# Patient Record
Sex: MALE | Race: WHITE | NOT HISPANIC OR LATINO | Employment: FULL TIME | URBAN - METROPOLITAN AREA
[De-identification: names, ages, dates, MRNs, and addresses within clinical notes are randomized per-mention and may not be internally consistent; named-entity substitution may affect disease eponyms.]

---

## 2020-06-06 ENCOUNTER — OCCUPATIONAL MEDICINE (OUTPATIENT)
Dept: URGENT CARE | Facility: PHYSICIAN GROUP | Age: 45
End: 2020-06-06
Payer: COMMERCIAL

## 2020-06-06 ENCOUNTER — HOSPITAL ENCOUNTER (OUTPATIENT)
Dept: RADIOLOGY | Facility: MEDICAL CENTER | Age: 45
End: 2020-06-06
Attending: PHYSICIAN ASSISTANT
Payer: COMMERCIAL

## 2020-06-06 VITALS
DIASTOLIC BLOOD PRESSURE: 76 MMHG | OXYGEN SATURATION: 98 % | BODY MASS INDEX: 31.69 KG/M2 | RESPIRATION RATE: 18 BRPM | HEART RATE: 80 BPM | SYSTOLIC BLOOD PRESSURE: 128 MMHG | TEMPERATURE: 97.8 F | WEIGHT: 234 LBS | HEIGHT: 72 IN

## 2020-06-06 DIAGNOSIS — S63.650A SPRAIN OF METACARPOPHALANGEAL (MCP) JOINT OF RIGHT INDEX FINGER, INITIAL ENCOUNTER: ICD-10-CM

## 2020-06-06 PROCEDURE — 99203 OFFICE O/P NEW LOW 30 MIN: CPT | Performed by: PHYSICIAN ASSISTANT

## 2020-06-06 PROCEDURE — 73130 X-RAY EXAM OF HAND: CPT | Mod: RT

## 2020-06-06 ASSESSMENT — ENCOUNTER SYMPTOMS
DIAPHORESIS: 0
COUGH: 0
FEVER: 0
SORE THROAT: 0
CHILLS: 0
DIZZINESS: 0

## 2020-06-06 NOTE — PROGRESS NOTES
Subjective:   Harinder Simpson is a 45 y.o. male who presents for Finger Injury (New WC - R index finger, swollen xyesterday, injured pulling a case out, pt states he heard a pop)    HPI:  DOI: 6/5/2020  MRI: Patient was pulling a box of celery out of his truck when he heard a pop to his right index finger and noticed some pain and swelling.  Currently he is in mild pain and only notices pain when he flexes his finger or tries to lift.  He describes the pain as a deep throbbing sensation with occasional radiating pain of his right forearm.  He states he still feels some clicking and popping sensation when he flexes his index finger.  He has not tried anything for the pain as of yet.  The patient is right-handed.  He denies having a second job.  He denies any previous injury or surgery to this hand or finger.      Review of Systems   Constitutional: Negative for chills, diaphoresis and fever.   HENT: Negative for sore throat.    Respiratory: Negative for cough.    Cardiovascular: Negative for chest pain.   Musculoskeletal:        Pain to the right index finger.  Describes a popping and clicking sensation with finger flexion.  Mild tenderness to the proximal phalanx.  Denies any loss of sensation.  Pain is described as a dull ache with occasional radiating pain of his right forearm.   Skin: Negative for rash.        No bruising to the right index finger or hand.   Neurological: Negative for dizziness.       PMH:   No pertinent past medical history to this problem  MEDS:  Medications were reviewed in EMR  ALLERGIES:  Allergies were reviewed in EMR  FH:   No pertinent family history to this problem       Objective:     /76 (BP Location: Left arm, Patient Position: Sitting, BP Cuff Size: Adult)   Pulse 80   Temp 36.6 °C (97.8 °F) (Temporal)   Resp 18   Ht 1.829 m (6')   Wt 106.1 kg (234 lb)   SpO2 98%   BMI 31.74 kg/m²     Physical Exam    Constitutional: Pt is oriented to person, place, and time.   Appears well-developed and well-nourished. No distress.   HENT:   Mouth/Throat: Oropharynx is clear and moist.   Eyes: Conjunctivae are normal.   Cardiovascular: Normal rate.    Pulmonary/Chest: Effort normal.     Musculoskeletal:   Right hand and wrist: No pain to palpation over the CMC joints metacarpals or MCP joints.  Full and pain-free range of motion to the wrist.  No discoloration or edema noted on the hand or wrist.    Right index finger: No pain to palpation over the MCP joint.  Mild tenderness to palpation over the proximal phalanx.  No tenderness palpation over the PIP or DIP joints.  Mild uniform edema without any ecchymosis or erythema.  No lacerations or sign of infection.  The patient is distally neurovascularly intact.  Patient has full flexion of the MCP PIP and DIP joints.  He is able to fully extend the finger.  Capillary refill is less than 2 seconds.  Strength is 5/5 but does have some discomfort.    Neurological: Pt is alert and oriented to person, place, and time. Coordination normal.   Skin: Skin is warm. Pt is not diaphoretic. No erythema.   Psychiatric: Pt has a normal mood and affect.  Behavior is normal.     X-ray right hand: No acute fracture or bony abnormality.      Assessment/Plan:     Diagnosis and associated orders:     1. Sprain of metacarpophalangeal (MCP) joint of right index finger, initial encounter  DX-HAND 3+ RIGHT      Comments/MDM:       • Differential diagnoses and treatment options were discussed with the patient at length today.  No bony abnormalities or dislocation found on x-ray.  Continue using ice to decrease inflammation.  May use ibuprofen and Tylenol as needed for pain.  Volar splint was given in clinic and finger buddy taped for comfort.  Only wear for comfort.  Follow-up in clinic on 6/12/2020 for reevaluation.           Differential diagnosis, natural history, supportive care, and indications for immediate follow-up discussed.    Advised the patient to  follow-up with the primary care physician for recheck, reevaluation, and consideration of further management.    Please note that this dictation was created using voice recognition software. I have made reasonable attempt to correct obvious errors, but I expect that there are errors of grammar and possibly content that I did not discover before finalizing the note.    This note was electronically signed by JAYSON Ventura PA-C

## 2020-06-06 NOTE — PATIENT INSTRUCTIONS
Finger Sprain  A finger sprain is an injury to one of the strong bands of tissue (ligaments) that connect the bones in the finger. The ligament can be stretched too much, or it can tear. A tear can be either partial or complete. The severity of the sprain depends on how much of the ligament was damaged or torn.  CAUSES  This injury is often caused by a fall or an accident. For example, if you extend your hands to catch an object or to protect yourself during a fall, the force of impact may cause the ligaments in your finger to stretch too much.  RISK FACTORS  The following factors may make you more likely to have this injury:  · Playing sports that involve a greater risk of falling, such as skiing.  · Playing sports that involve catching an object, such as basketball.  · Having poor strength and flexibility.  SYMPTOMS  Symptoms of this condition include:  · Pain at the affected finger joint, especially when bending or extending the finger.  · Loss of motion in the finger.  · Swelling.  · Tenderness.  · Bruising.  DIAGNOSIS  This condition is diagnosed with a medical history and physical exam. You may also have an X-ray of your finger to rule out a fracture or dislocation.  TREATMENT  Treatment varies depending on the severity of the sprain. If your ligament is overstretched or partially torn, treatment usually involves:  · Keeping the finger in a fixed position (immobilization) for a period of time. To help you do this, your health care provider may apply a bandage, splint, or cast to keep the finger from moving until it heals. In some cases, the finger may be taped to the fingers beside it (teetee taping).  · Taking medicines for pain.  · Doing exercises for the finger after it has begun to heal.  If your ligament is fully torn, you may need surgery to reconnect the ligament to the bone. After surgery, a cast or splint will be applied.  HOME CARE INSTRUCTIONS  If You Have a Splint:   · Wear the splint as told by  your health care provider. Remove it only as told by your health care provider.  · Loosen the splint if your fingers tingle, become numb, or turn cold and blue.  · Do not let your splint get wet if it is not waterproof.  · Keep the splint clean.  If You Have a Cast:   · Do not stick anything inside the cast to scratch your skin. Doing that increases your risk of infection.  · Check the skin around the cast every day. Tell your health care provider about any concerns.  · You may put lotion on dry skin around the edges of the cast. Do not put lotion on the skin underneath the cast.  · Do not let your cast get wet if it is not waterproof.  · Keep the cast clean.  Bathing   · If your splint or cast is not waterproof, cover it with a watertight plastic bag when you take a bath or a shower.  · Keep any bandages (dressings) dry until your health care provider says they can be removed.  Managing Pain, Stiffness, and Swelling   · If directed, put ice on the injured area:  ¨ Put ice in a plastic bag.  ¨ Place a towel between your skin and the bag.  ¨ Leave the ice on for 20 minutes, 2-3 times a day.  · Move your fingers often to avoid stiffness and to lessen swelling.  · Raise (elevate) the injured area above the level of your heart while you are sitting or lying down.  General Instructions   · Do not put pressure on any part of the cast or splint until it is fully hardened. This may take several hours.  · Take over-the-counter and prescription medicines only as told by your health care provider.  · Do not drive or operate heavy machinery while taking prescription pain medicine.  · Do exercises as told by your health care provider or physical therapist.  · Do not wear rings on your injured finger.  · Keep all follow-up visits as told by your health care provider. This is important.  SEEK MEDICAL CARE IF:  · Your pain is not controlled with medicine.  · Your bruising or swelling gets worse.  · Your cast or splint is  damaged.  · Your finger is numb or blue.  · Your finger feels colder than normal.  This information is not intended to replace advice given to you by your health care provider. Make sure you discuss any questions you have with your health care provider.  Document Released: 01/25/2006 Document Revised: 04/10/2017 Document Reviewed: 10/27/2016  [employer] Interactive Patient Education © 2017 [employer]

## 2020-06-06 NOTE — LETTER
[facility] [address] [place] [address]  Phone:  [phone] - Fax:  [phone]   [facility] Progress Report and Disability Certification  Date of Service: 6/6/2020   No Show:  No  Date / Time of Next Visit: 6/12/2020   Claim Information   Patient Name: Harinder Simpson  Claim Number:     Employer: [employer]  Date of Injury: 6/5/2020     Insurer / TPA: [employer] ID / SSN:     Occupation: Supervisor  Diagnosis: The encounter diagnosis was Sprain of metacarpophalangeal (MCP) joint of right index finger, initial encounter.    Medical Information   Related to Industrial Injury? Yes    Subjective Complaints:  HPI:   DOI: 6/5/2020  JOSE L: Patient was pulling a box of celery out of his truck when he heard a pop to his right index finger and noticed some pain and swelling.  Currently he is in mild pain and only notices pain when he flexes his finger or tries to the lift.  He describes the pain as a deep throbbing sensation with occasional radiating pain up his right forearm.  He states he still feels some clicking and popping sensation when he flexes his index finger.  He has not tried anything for the pain as of yet.  The patient is right-handed.  He denies having a second job.  He denies any previous injury or surgery to this hand or finger.    Review of Systems   Constitutional: Negative for chills, diaphoresis and fever.   HENT: Negative for sore throat.    Respiratory: Negative for cough.    Cardiovascular: Negative for chest pain.   Musculoskeletal:        Pain to the right index finger.  Describes a popping and clicking sensation with finger flexion.  Mild tenderness to the proximal phalanx.  Denies any loss of sensation.  Pain is described as a dull ache with occasional radiating pain of his right forearm.   Skin: Negative for rash.        No bruising to the right index finger or hand.   Neurological: Negative for dizziness.     PMH:      No pertinent past medical history to this problem  MEDS:  Medications were reviewed in EMR  ALLERGIES:  Allergies were reviewed in EMR  FH:   No pertinent family history to this problem     Objective Findings: Constitutional: Pt is oriented to person, place, and time.  Appears well-developed and well-nourished. No distress.   HENT:   Mouth/Throat: Oropharynx is clear and moist.   Eyes: Conjunctivae are normal.   Cardiovascular: Normal rate.    Pulmonary/Chest: Effort normal.   Musculoskeletal:   Right hand and wrist: No pain to palpation over the CMC joints metacarpals or MCP joints.  Full and pain-free range of motion to the wrist.  No discoloration or edema noted on the hand or wrist.  Right index finger: No pain to palpation over the MCP joint.  Mild tenderness to palpation over the proximal phalanx.  No tenderness palpation over the PIP or DIP joints.  Mild uniform edema without any ecchymosis or erythema.  No lacerations or sign of infection.  The patient is distally neurovascularly intact.  Patient has full flexion of the MCP PIP and DIP joints.  He is able to fully extend the finger.  Capillary refill is less than 2 seconds.  Strength is 5/5 but does have some discomfort.  Neurological: Pt is alert and oriented to person, place, and time. Coordination normal.   Skin: Skin is warm. Pt is not diaphoretic. No erythema.   Psychiatric: Pt has a normal mood and affect.  Behavior is normal.      Pre-Existing Condition(s):     Assessment:   Initial Visit    Status: Additional Care Required  Permanent Disability:No    Plan:      Diagnostics: X-ray    Comments:  No acute fracture or bony abnormality found on x-ray today.  Recommended rice therapy.  May use Tylenol and ibuprofen as needed for pain.    Disability Information   Status: Released to Restricted Duty    From:  6/6/2020  Through: 6/12/2020 Restrictions are: Temporary   Physical Restrictions   Sitting:    Standing:    Stooping:    Bending:      Squatting:     Walking:    Climbing:    Pushing:      Pulling:    Other:    Reaching Above Shoulder (L):   Reaching Above Shoulder (R):       Reaching Below Shoulder (L):    Reaching Below Shoulder (R):      Not to exceed Weight Limits   Carrying(hrs):   Weight Limit(lb):   Lifting(hrs):   Weight  Limit(lb):     Comments: Limit amount of lifting with right hand.  Do not lift more than 15 pounds with right hand.    Repetitive Actions   Hands: i.e. Fine Manipulations from Grasping:     Feet: i.e. Operating Foot Controls:     Driving / Operate Machinery:     Physician Name: Andreia Ventura P.A.-C. Physician Signature: ANDREIA Venegas P.A.-C. e-Signature: Dr. Aureliano Decker, Medical Director   Clinic Name / Location: [address] [address] Clinic Phone Number: Dept: [phone]   Appointment Time: 11:50 Am Visit Start Time: 12:11 PM   Check-In Time:  12:06 Pm Visit Discharge Time:  2:17PM   Original-Treating Physician or Chiropractor    Page 2-Insurer/TPA    Page 3-Employer    Page 4-Employee

## 2020-06-06 NOTE — LETTER
EMPLOYEE’S CLAIM FOR COMPENSATION/ REPORT OF INITIAL TREATMENT  FORM C-4    EMPLOYEE’S CLAIM - PROVIDE ALL INFORMATION REQUESTED   First Name  Harinder Last Name  Tatiana Birthdate                    1975                Sex  Male Claim Number   Home Address  [address] Age  45 y.o. 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 (if applicable)  pulling a case of celery    How did this injury or occupational disease occur? (Be specific an answer in detail. Use additional sheet if necessary)  Was moving a case of celery went to side it hooked it with my right index finger. finger popped   If you believe that you have an occupational disease, when did you first have knowledge of the disability and it relationship to your employment?  n/a Witnesses to the Accident  n/a      Nature of Injury or Occupational Disease  Workers' Compensation  Part(s) of Body Injured or Affected  Finger (R), N/A, N/A    I certify that the above is true and correct to the best of my knowledge and that I have provided this information in order to obtain the benefits of [facility]  Industrial Insurance and Occupational Diseases Acts [place] inclusive or Chapter 617 of [place]).  I hereby authorize any physician, chiropractor, surgeon, practitioner, or other person, any hospital, including [facility] or [facility], any medical service organization, any insurance company, or other institution or organization to release to each other, any medical or other information, including benefits paid or payable, pertinent to this injury or disease, except information relative to diagnosis, treatment and/or counseling for AIDS, psychological conditions, alcohol or controlled substances, for which I must give specific authorization.  A Photostat of this authorization shall be as valid as the original.     Date   Place   Employee’s Signature   THIS REPORT MUST BE COMPLETED AND MAILED WITHIN 3 WORKING DAYS OF TREATMENT   Place  [facility]  Name of Facility  [place]   Date  6/6/2020 Diagnosis  (S63.650A) Sprain of metacarpophalangeal (MCP) joint of right index finger, initial encounter Is there evidence the injured employee was under the influence of alcohol and/or another controlled substance at the time of accident?   Hour  12:11 PM Description of Injury or Disease  The encounter diagnosis was Sprain of metacarpophalangeal (MCP) joint of right index finger, initial encounter. No   Treatment  No acute fracture or bony abnormality found on x-ray today.  RICE TREATMENT FOR EXTREMITY INJURIES:  R-rest the extremity as much as possible while pain and swelling persist  I-ice the extremity 15 minutes every 2 hours for the first 24 hours, then 4-5 times daily   C-compress the extremity either with splint or ace wrap as directed  E-elevate the extremity to help with swelling    May use Tylenol and ibuprofen as needed for pain and inflammation.  Volar finger splint provided in clinic today use as needed for comfort.  Follow-up in clinic on 6/12/2020 for recheck and  "evaluation.  Have you advised the patient to remain off work five days or more? No   X-Ray Findings  Negative   If Yes   From Date  To Date      From information given by the employee, together with medical evidence, can you directly connect this injury or occupational disease as job incurred?  Yes If No Full Duty No Modified Duty  Yes   Is additional medical care by a physician indicated?  Yes If Modified Duty, Specify any Limitations / Restrictions  Do not lift more than 15 pounds with the right hand.   Do you know of any previous injury or disease contributing to this condition or occupational disease?                            No   Date  6/6/2020 Print Doctor’s Name Andreia Venutra P.A.-C. I certify the employer’s copy of  this form was mailed on:   Address  [address] Insurer’s Use Only     [facility]  [address]    Provider’s Tax ID Number  [id number] Telephone  Dept: [phone]        rodriguez-ANDREIA Ceballos P.A.-C.   e-Signature: Dr. Aureliano Decker,   Medical Director Degree  MD        ORIGINAL-TREATING PHYSICIAN OR CHIROPRACTOR    PAGE 2-INSURER/TPA    PAGE 3-EMPLOYER    PAGE 4-EMPLOYEE             Form C-4 (rev.10/07)              BRIEF DESCRIPTION OF RIGHTS AND BENEFITS  (Pursuant to [place]RS 616C.050)    Notice of Injury or Occupational Disease (Incident Report Form C-1): If an injury or occupational disease (OD) arises out of and in the course of employment, you must provide written notice to your employer as soon as practicable, but no later than 7 days after the accident or OD. Your employer shall maintain a sufficient supply of the required forms.    Claim for Compensation (Form C-4): If medical treatment is sought, the form C-4 is available at the place of initial treatment. A completed \"Claim for Compensation\" (Form C-4) must be filed within 90 days after an accident or OD. The treating physician or chiropractor must, within 3 working days after treatment, complete and mail " to the employer, the employer's insurer and third-party , the Claim for Compensation.    Medical Treatment: If you require medical treatment for your on-the-job injury or OD, you may be required to select a physician or chiropractor from a list provided by your workers’ compensation insurer, if it has contracted with an Organization for Managed Care (MCO) or Preferred Provider Organization (PPO) or providers of health care. If your employer has not entered into a contract with an MCO or PPO, you may select a physician or chiropractor from the Panel of Physicians and Chiropractors. Any medical costs related to your industrial injury or OD will be paid by your insurer.    Temporary Total Disability (TTD): If your doctor has certified that you are unable to work for a period of at least 5 consecutive days, or 5 cumulative days in a 20-day period, or places restrictions on you that your employer does not accommodate, you may be entitled to TTD compensation.    Temporary Partial Disability (TPD): If the wage you receive upon reemployment is less than the compensation for TTD to which you are entitled, the insurer may be required to pay you TPD compensation to make up the difference. TPD can only be paid for a maximum of 24 months.    Permanent Partial Disability (PPD): When your medical condition is stable and there is an indication of a PPD as a result of your injury or OD, within 30 days, your insurer must arrange for an evaluation by a rating physician or chiropractor to determine the degree of your PPD. The amount of your PPD award depends on the date of injury, the results of the PPD evaluation and your age and wage.    Permanent Total Disability (PTD): If you are medically certified by a treating physician or chiropractor as permanently and totally disabled and have been granted a PTD status by your insurer, you are entitled to receive monthly benefits not to exceed 66 2/3% of your average monthly  wage. The amount of your PTD payments is subject to reduction if you previously received a PPD award.    Vocational Rehabilitation Services: You may be eligible for vocational rehabilitation services if you are unable to return to the job due to a permanent physical impairment or permanent restrictions as a result of your injury or occupational disease.    Transportation and Per Rosemary Reimbursement: You may be eligible for travel expenses and per rosemary associated with medical treatment.    Reopening: You may be able to reopen your claim if your condition worsens after claim closure.    Appeal Process: If you disagree with a written determination issued by the insurer or the insurer does not respond to your request, you may appeal to the Department of Administration, , by following the instructions contained in your determination letter. You must appeal the determination within 70 days from the date of the determination letter at [address], [address], [place], [place] [address], or [address], [address], [place] [address]. If you disagree with the  decision, you may appeal to the Department of Administration, . You must file your appeal within 30 days from the date of the  decision letter at [address], [address], [place], [place] [address], or [address], [address], [place] [address]. If you disagree with a decision of an , you may file a petition for judicial review with the District Court. You must do so within 30 days of the Appeal Officer’s decision. You may be represented by an  at your own expense or you may contact the [facility] for possible representation.    [place]  for Injured Workers (NAIW): If you disagree with a  decision, you may request that NAIW represent you without charge at an  Hearing. For information regarding denial of benefits,  you may contact the [facility] at: [address], [address], [place] [address], [phone], or [address], [address], [place] [address], [phone]    To File a Complaint with the Division: If you wish to file a complaint with the  of the Division of Industrial Relations (DIR),  please contact the Workers’ Compensation Section, [address], [address], [place], [place] [address], telephone (525) [phone], or [address], [address], [place], [place] [address], telephone [phone].    For assistance with Workers’ Compensation Issues: You may contact the Office of the Governor Consumer Health Assistance, [address] [place] [address], [address], [place], [place] [address], Toll Free 1-350.565.1403, Web site: http://govcha.[facility].nv., E-mail [email or web address]                   __________________________________________________________________                                                     _________        Employee Name / Signature                                                                                                                                              Date                                                                                                                                                                                                     D-2 (rev. 06/18)

## 2020-06-12 ENCOUNTER — OCCUPATIONAL MEDICINE (OUTPATIENT)
Dept: URGENT CARE | Facility: PHYSICIAN GROUP | Age: 45
End: 2020-06-12
Payer: COMMERCIAL

## 2020-06-12 VITALS
HEIGHT: 72 IN | HEART RATE: 86 BPM | RESPIRATION RATE: 18 BRPM | TEMPERATURE: 98 F | OXYGEN SATURATION: 96 % | BODY MASS INDEX: 31.69 KG/M2 | DIASTOLIC BLOOD PRESSURE: 72 MMHG | WEIGHT: 234 LBS | SYSTOLIC BLOOD PRESSURE: 114 MMHG

## 2020-06-12 DIAGNOSIS — S63.650A SPRAIN OF METACARPOPHALANGEAL (MCP) JOINT OF RIGHT INDEX FINGER, INITIAL ENCOUNTER: ICD-10-CM

## 2020-06-12 PROCEDURE — 99214 OFFICE O/P EST MOD 30 MIN: CPT | Performed by: PHYSICIAN ASSISTANT

## 2020-06-12 ASSESSMENT — ENCOUNTER SYMPTOMS
PALPITATIONS: 0
NAUSEA: 0
FEVER: 0
TINGLING: 0
TREMORS: 0
CLAUDICATION: 0
DIARRHEA: 0
COUGH: 0
SPEECH CHANGE: 0
VOMITING: 0
CHILLS: 0
DOUBLE VISION: 0
DIZZINESS: 0
SEIZURES: 0
SHORTNESS OF BREATH: 0
ORTHOPNEA: 0
FOCAL WEAKNESS: 0
ABDOMINAL PAIN: 0
BLURRED VISION: 0
HEADACHES: 0
WEAKNESS: 0
SENSORY CHANGE: 0
LOSS OF CONSCIOUSNESS: 0

## 2020-06-12 NOTE — LETTER
[facility] [address] [address]  Phone:  [phone] - Fax:  [phone]   [facility] Progress Report and Disability Certification  Date of Service: 6/12/2020   No Show:  No  Date / Time of Next Visit: 6/26/2020   Claim Information   Patient Name: Harinder Simpson  Claim Number:     Employer: [employer]  Date of Injury: 6/5/2020     Insurer / TPA: [employer]  ID / SSN:     Occupation: Supervisor  Diagnosis: The encounter diagnosis was Sprain of metacarpophalangeal (MCP) joint of right index finger, initial encounter.    Medical Information   Related to Industrial Injury? Yes    Subjective Complaints:  DOI: 6/5/2020  2nd visit: Patient was pulling a box of celery out of his truck when he heard a pop to his right index finger and noticed some pain and swelling. Previous x ray were normal.  Pt reports minimal improvement.   Objective Findings: Constitutional: Pt is oriented to person, place, and time.  Appears well-developed and well-nourished. No distress.   HENT:   Mouth/Throat: Oropharynx is clear and moist.   Eyes: Conjunctivae are normal.   Cardiovascular: Normal rate.    Pulmonary/Chest: Effort normal.   Musculoskeletal: PTP of the right index finger.  Swollen.  Full ROM  Neurological: Pt is alert and oriented to person, place, and time. Coordination normal.   Skin: Skin is warm. Pt is not diaphoretic. No erythema.   Psychiatric: Pt has a normal mood and affect.  Behavior is normal.      Pre-Existing Condition(s):     Assessment:   Condition Same    Status: Additional Care Required  Permanent Disability:No    Plan:      Diagnostics:      Comments:       Disability Information   Status: Released to Restricted Duty    From:  6/12/2020  Through: 6/26/2020 Restrictions are: Temporary   Physical Restrictions   Sitting:    Standing:    Stooping:    Bending:      Squatting:    Walking:    Climbing:    Pushing:         Pulling:    Other:    Reaching Above Shoulder (L):   Reaching Above Shoulder (R):       Reaching Below Shoulder (L):    Reaching Below Shoulder (R):      Not to exceed Weight Limits   Carrying(hrs):   Weight Limit(lb): < or = to 10 pounds Lifting(hrs):   Weight  Limit(lb): < or = to 10 pounds   Comments: Restrictions for right hand/index finger only.  Recommend buddy tape/splint while at work.  Ibuprofen/tylenol as needed.    Repetitive Actions   Hands: i.e. Fine Manipulations from [place] hrs/day   Feet: i.e. Operating Foot Controls:     Driving / Operate Machinery:     Physician Name: Vishnu Grubbs P.A.-C. Physician Signature: VISHNU Hargrove P.A.-C. e-Signature: Dr. Aureliano Decker, Medical Director   Clinic Name / Location: [address] [address] Clinic Phone Number: Dept: [phone]   Appointment Time: 4:00 Pm Visit Start Time: 4:02 PM   Check-In Time:  3:50 Pm Visit Discharge Time:  4:26 PM   Original-Treating Physician or Chiropractor    Page 2-Insurer/TPA    Page 3-Employer PM   Page 4-Employee

## 2020-06-12 NOTE — PROGRESS NOTES
Subjective:      Harinder Simspon is a 45 y.o. male who presents with Follow-Up (WC F/V - R index finger, still sore & swollen )        HPI  DOI: 6/5/2020  2nd visit: Patient was pulling a box of celery out of his truck when he heard a pop to his right index finger and noticed some pain and swelling. Previous x ray were normal.  Pt reports minimal improvement.     Review of Systems   Constitutional: Negative for chills and fever.   Eyes: Negative for blurred vision and double vision.   Respiratory: Negative for cough and shortness of breath.    Cardiovascular: Negative for chest pain, palpitations, orthopnea, claudication and leg swelling.   Gastrointestinal: Negative for abdominal pain, diarrhea, nausea and vomiting.   Musculoskeletal:        Right finger pain   Skin: Negative for rash.   Neurological: Negative for dizziness, tingling, tremors, sensory change, speech change, focal weakness, seizures, loss of consciousness, weakness and headaches.   All other systems reviewed and are negative.    Pt PMH, SocHx, SurgHx, FamHx, Drug allergies and medications reviewed with pt/EPIC.  Family history reviewed, it is not pertinent to this complaint.       Objective:     Blood Pressure 114/72 (BP Location: Right arm, Patient Position: Sitting, BP Cuff Size: Large adult)   Pulse 86   Temperature 36.7 °C (98 °F) (Temporal)   Respiration 18   Height 1.829 m (6')   Weight 106.1 kg (234 lb)   Oxygen Saturation 96%   Body Mass Index 31.74 kg/m²      Physical Exam    Constitutional: Pt is oriented to person, place, and time.  Appears well-developed and well-nourished. No distress.   HENT:   Mouth/Throat: Oropharynx is clear and moist.   Eyes: Conjunctivae are normal.   Cardiovascular: Normal rate.    Pulmonary/Chest: Effort normal.   Musculoskeletal: PTP of the right index finger.  Swollen.  Full ROM  Neurological: Pt is alert and oriented to person, place, and time. Coordination normal.   Skin: Skin is warm. Pt is not  diaphoretic. No erythema.   Psychiatric: Pt has a normal mood and affect.  Behavior is normal.          Assessment/Plan:       1. Sprain of metacarpophalangeal (MCP) joint of right index finger, initial encounter  - D39/Return in 2 weeks for reevaluation

## 2020-06-26 ENCOUNTER — OCCUPATIONAL MEDICINE (OUTPATIENT)
Dept: URGENT CARE | Facility: PHYSICIAN GROUP | Age: 45
End: 2020-06-26
Payer: COMMERCIAL

## 2020-06-26 VITALS
RESPIRATION RATE: 14 BRPM | HEIGHT: 72 IN | WEIGHT: 234 LBS | HEART RATE: 64 BPM | OXYGEN SATURATION: 91 % | TEMPERATURE: 98.7 F | BODY MASS INDEX: 31.69 KG/M2 | DIASTOLIC BLOOD PRESSURE: 64 MMHG | SYSTOLIC BLOOD PRESSURE: 112 MMHG

## 2020-06-26 DIAGNOSIS — S63.650D SPRAIN OF METACARPOPHALANGEAL (MCP) JOINT OF RIGHT INDEX FINGER, SUBSEQUENT ENCOUNTER: ICD-10-CM

## 2020-06-26 PROCEDURE — 99213 OFFICE O/P EST LOW 20 MIN: CPT | Performed by: INTERNAL MEDICINE

## 2020-06-26 NOTE — LETTER
[facility] [address] [place] [address]  Phone:  [phone] - Fax:  [phone]   [facility] Progress Report and Disability Certification  Date of Service: 6/26/2020   No Show:  No  Date / Time of Next Visit:     Claim Information   Patient Name: Harinder Simpson  Claim Number:     Employer: [employer]  Date of Injury: 6/5/2020     Insurer / TPA: [employer]  ID / SSN:     Occupation: Supervisor  Diagnosis: The encounter diagnosis was Sprain of metacarpophalangeal (MCP) joint of right index finger, subsequent encounter.    Medical Information   Related to Industrial Injury? Yes    Subjective Complaints:  Harinder Simpson is a 45 y.o. male who presents for Joint Injection (R pointer finger/ LILIANE FV/ DOI: 06/05/20)  Today-6/26/2020-patient says the right index finger is almost healed and very minimal pain now when he does certain kind of work and is been working full-time when he wants to be released and be discharged   Objective Findings: Physical Exam  Musculoskeletal:         General: Signs of injury (Right index finger-no tenderness on palpation range of motion is within normal limits very minimal swelling) present.        Pre-Existing Condition(s):     Assessment:   Condition Improved    Status: Discharged /  MMI  Permanent Disability:No    Plan:      Diagnostics:      Comments:       Disability Information   Status: Released to Full Duty    From:     Through:   Restrictions are:     Physical Restrictions   Sitting:    Standing:    Stooping:    Bending:      Squatting:    Walking:    Climbing:    Pushing:      Pulling:    Other:    Reaching Above Shoulder (L):   Reaching Above Shoulder (R):       Reaching Below Shoulder (L):    Reaching Below Shoulder (R):      Not to exceed Weight Limits   Carrying(hrs):   Weight Limit(lb):   Lifting(hrs):   Weight  Limit(lb):     Comments:      Repetitive Actions   Hands: i.e. Fine  Manipulations from Grasping:     Feet: i.e. Operating Foot Controls:     Driving / Operate Machinery:     Provider Name:   Glenn Taylor M.D. Physician Signature:  Physician Name:     Clinic Name / Location: [address] [address] Clinic Phone Number: Dept: [phone]   Appointment Time: 4:00 Pm Visit Start Time: 4:43 PM   Check-In Time:  4:08 Pm Visit Discharge Time:  5:07 PM   Original-Treating Physician or Chiropractor    Page 2-Insurer/TPA    Page 3-Employer    Page 4-Employee

## 2020-06-26 NOTE — PROGRESS NOTES
Subjective:   Harinder Simpson is a 45 y.o. male who presents for Joint Injection (R pointer finger/ WC FV/ DOI: 06/05/20)  Today-6/26/2020-patient says the right index finger is almost healed and very minimal pain now when he does certain kind of work and is been working full-time when he wants to be released and be discharged    HPI  DOI: 6/5/2020  2nd visit: Patient was pulling a box of celery out of his truck when he heard a pop to his right index finger and noticed some pain and swelling. Previous x ray were normal.  Pt reports minimal improvement  Harinder Simpson is a 45 y.o. male who presents for Joint Injection (R pointer finger/ WC FV/ DOI: 06/05/20)  Today-6/26/2020-patient says the right index finger is almost healed and very minimal pain now when he does certain kind of work and is been working full-time when he wants to be released and be discharged   HPI  ROS  No Known Allergies   Objective:   /64 (BP Location: Left arm, Patient Position: Sitting, BP Cuff Size: Adult)   Pulse 64   Temp 37.1 °C (98.7 °F) (Temporal)   Resp 14   Ht 1.829 m (6')   Wt 106.1 kg (234 lb)   SpO2 91%   BMI 31.74 kg/m²   Physical Exam  Musculoskeletal:         General: Signs of injury (Right index finger-no tenderness on palpation range of motion is within normal limits very minimal swelling) present.       Physical Exam  Musculoskeletal:         General: Signs of injury (Right index finger-no tenderness on palpation range of motion is within normal limits very minimal swelling) present.        Assessment/Plan:   1. Sprain of metacarpophalangeal (MCP) joint of right index finger, subsequent encounter      Differential diagnosis, natural history, supportive care, and indications for immediate follow-up discussed.